# Patient Record
Sex: MALE | Race: WHITE
[De-identification: names, ages, dates, MRNs, and addresses within clinical notes are randomized per-mention and may not be internally consistent; named-entity substitution may affect disease eponyms.]

---

## 2020-03-26 ENCOUNTER — HOSPITAL ENCOUNTER (OUTPATIENT)
Dept: HOSPITAL 35 - OPONC | Age: 65
End: 2020-03-26
Attending: SPECIALIST
Payer: COMMERCIAL

## 2020-03-26 VITALS — DIASTOLIC BLOOD PRESSURE: 65 MMHG | SYSTOLIC BLOOD PRESSURE: 117 MMHG

## 2020-03-26 DIAGNOSIS — N39.0: Primary | ICD-10-CM

## 2020-03-26 PROCEDURE — 27000 TENOTOMY ADDUCTOR HIP PERQ: CPT

## 2020-03-26 PROCEDURE — 95000: CPT

## 2020-03-26 NOTE — NUR
IN FOR PICC LINE PLACEMENT AND 1ST DOSE OF CEFEPIME FOR A UTI. ADMISSION
HISOTRY AND ASSESSMENT COMPLETED. PATIENT HAS NO ALLERGIES. IV TEAM PLACED
PICC LINE IN [place] WITHOUT DIFFICULTY. PLACEMENT VERIFIED BY CXR. TOLERATED
INFUSION WITHOUT INCIDENT. OBSERVED FOR 15 MINUTES AND THEN DISMISSED IN
STABLE CONDITION.  NURSE CAME AND INSTRUCTED PATIENT ON HOME CARE.

## 2020-03-27 NOTE — NUR
RISK, BENEFITS, AND ALTERNATIVE TREATMENT DISCUSSED WITH THE PATIENT RELATED
TO PICC PROCEDURE. TEACHING GIVEN RELATED TO POSSIBLE COMPLICATIONS SUCH AS
BLEEDING, INFECTION, CLOT, OR VESSEL PERFORATION. INSTRUCTION GIVEN RELATED TO
CLABSI PREVENTION WITH LITERATURE PROVIDED. PATIENT VOICES UNDERSTANDING TO
THE ABOVE AND GIVES CONSENT. WRITTEN CONSENT OBTAINED. SINGLE LUMEN PICC
PLACED TO RUE BASILIC VEIN. ONE STICK AND NO COMPLICATIONS . PATIENT TOLERATED
WELL. CHEST XRAY OBTAINED WITH PICC PULLED BACK 2 CM AFTERWARDS WITH PICC NOW
IN DISTAL SVC. PATIENT'S RN NOFIED OKAY TO USE.